# Patient Record
Sex: FEMALE | Race: BLACK OR AFRICAN AMERICAN | Employment: UNEMPLOYED | URBAN - METROPOLITAN AREA
[De-identification: names, ages, dates, MRNs, and addresses within clinical notes are randomized per-mention and may not be internally consistent; named-entity substitution may affect disease eponyms.]

---

## 2023-07-19 ENCOUNTER — APPOINTMENT (OUTPATIENT)
Dept: CT IMAGING | Age: 33
End: 2023-07-19
Payer: COMMERCIAL

## 2023-07-19 ENCOUNTER — HOSPITAL ENCOUNTER (EMERGENCY)
Age: 33
Discharge: HOME | End: 2023-07-19
Attending: EMERGENCY MEDICINE
Payer: COMMERCIAL

## 2023-07-19 VITALS
HEIGHT: 65 IN | DIASTOLIC BLOOD PRESSURE: 59 MMHG | TEMPERATURE: 97.6 F | SYSTOLIC BLOOD PRESSURE: 102 MMHG | BODY MASS INDEX: 23.32 KG/M2 | WEIGHT: 140 LBS | OXYGEN SATURATION: 99 % | RESPIRATION RATE: 18 BRPM | HEART RATE: 81 BPM

## 2023-07-19 DIAGNOSIS — S01.81XA FACIAL LACERATION, INITIAL ENCOUNTER: ICD-10-CM

## 2023-07-19 DIAGNOSIS — S09.90XA CLOSED HEAD INJURY, INITIAL ENCOUNTER: ICD-10-CM

## 2023-07-19 DIAGNOSIS — S02.85XA ORBITAL FRACTURE, CLOSED, INITIAL ENCOUNTER (HCC): Primary | ICD-10-CM
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
## 2023-07-19 PROCEDURE — 70450 CT HEAD/BRAIN W/O DYE: CPT

## 2023-07-19 PROCEDURE — 2500000003 HC RX 250 WO HCPCS: Performed by: NURSE PRACTITIONER

## 2023-07-19 PROCEDURE — 12011 RPR F/E/E/N/L/M 2.5 CM/<: CPT

## 2023-07-19 PROCEDURE — 99284 EMERGENCY DEPT VISIT MOD MDM: CPT

## 2023-07-19 PROCEDURE — 70486 CT MAXILLOFACIAL W/O DYE: CPT

## 2023-07-19 PROCEDURE — 72125 CT NECK SPINE W/O DYE: CPT

## 2023-07-19 PROCEDURE — 6370000000 HC RX 637 (ALT 250 FOR IP): Performed by: NURSE PRACTITIONER

## 2023-07-19 RX ORDER — NAPROXEN 250 MG/1
500 TABLET ORAL ONCE
Status: COMPLETED | OUTPATIENT
Start: 2023-07-19 | End: 2023-07-19

## 2023-07-19 RX ORDER — LORAZEPAM 1 MG/1
1 TABLET ORAL ONCE
Status: COMPLETED | OUTPATIENT
Start: 2023-07-19 | End: 2023-07-19

## 2023-07-19 RX ORDER — HYDROCODONE BITARTRATE AND ACETAMINOPHEN 5; 325 MG/1; MG/1
1 TABLET ORAL ONCE
Status: COMPLETED | OUTPATIENT
Start: 2023-07-19 | End: 2023-07-19

## 2023-07-19 RX ORDER — HYDROCODONE BITARTRATE AND ACETAMINOPHEN 5; 325 MG/1; MG/1
1 TABLET ORAL EVERY 6 HOURS PRN
Qty: 12 TABLET | Refills: 0 | Status: SHIPPED | OUTPATIENT
Start: 2023-07-19 | End: 2023-07-22

## 2023-07-19 RX ADMIN — HYDROCODONE BITARTRATE AND ACETAMINOPHEN 1 TABLET: 5; 325 TABLET ORAL at 06:58

## 2023-07-19 RX ADMIN — LIDOCAINE HYDROCHLORIDE 20 ML: 10; .005 INJECTION, SOLUTION EPIDURAL; INFILTRATION; INTRACAUDAL; PERINEURAL at 08:54

## 2023-07-19 RX ADMIN — NAPROXEN SODIUM 500 MG: 250 TABLET ORAL at 06:58

## 2023-07-19 RX ADMIN — LORAZEPAM 1 MG: 1 TABLET ORAL at 08:28

## 2023-07-19 ASSESSMENT — PAIN DESCRIPTION - ORIENTATION
ORIENTATION: RIGHT
ORIENTATION: RIGHT

## 2023-07-19 ASSESSMENT — PAIN - FUNCTIONAL ASSESSMENT
PAIN_FUNCTIONAL_ASSESSMENT: 0-10
PAIN_FUNCTIONAL_ASSESSMENT: ACTIVITIES ARE NOT PREVENTED

## 2023-07-19 ASSESSMENT — PAIN SCALES - GENERAL
PAINLEVEL_OUTOF10: 5
PAINLEVEL_OUTOF10: 5
PAINLEVEL_OUTOF10: 10
PAINLEVEL_OUTOF10: 10

## 2023-07-19 ASSESSMENT — PAIN DESCRIPTION - LOCATION
LOCATION: FACE;EYE
LOCATION: FACE;EYE

## 2023-07-19 ASSESSMENT — PAIN DESCRIPTION - DESCRIPTORS: DESCRIPTORS: SHARP

## 2023-07-19 ASSESSMENT — PAIN DESCRIPTION - PAIN TYPE
TYPE: ACUTE PAIN
TYPE: ACUTE PAIN

## 2023-07-19 NOTE — ED NOTES
Trinity Claude (patient sister) came and picked up her 4 kids and ID was checked before the kids got in the car.       Cassandra Boston  07/19/23 5147

## 2023-07-19 NOTE — DISCHARGE INSTRUCTIONS
Do not take Tylenol or acetaminophen-containing products while taking Norco.  Sedation precautions while taking Norco.    Do not drink alcohol while taking Norco.    Do not drive or operate heavy machinery while taking Norco.      Do not blow your nose!     Remember to take your disk to your ophthalmology appointment

## 2023-07-19 NOTE — ED NOTES
Discharge and education instructions reviewed. Patient verbalized understanding, teach-back successful. Patient denied questions at this time. No acute distress noted. Patient instructed to follow-up as noted - return to emergency department if symptoms worsen. Patient verbalized understanding. Discharged per EDMD with discharge instructions.         Sammy Wheeler RN  07/19/23 1300
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
